# Patient Record
Sex: FEMALE | Race: WHITE | NOT HISPANIC OR LATINO | Employment: OTHER | URBAN - NONMETROPOLITAN AREA
[De-identification: names, ages, dates, MRNs, and addresses within clinical notes are randomized per-mention and may not be internally consistent; named-entity substitution may affect disease eponyms.]

---

## 2019-08-02 PROCEDURE — 93306 TTE W/DOPPLER COMPLETE: CPT | Performed by: INTERNAL MEDICINE

## 2019-08-06 ENCOUNTER — OUTSIDE FACILITY SERVICE (OUTPATIENT)
Dept: CARDIOLOGY | Facility: CLINIC | Age: 69
End: 2019-08-06

## 2021-02-02 DIAGNOSIS — M25.561 CHRONIC PAIN OF BOTH KNEES: Primary | ICD-10-CM

## 2021-02-02 DIAGNOSIS — G89.29 CHRONIC PAIN OF BOTH KNEES: Primary | ICD-10-CM

## 2021-02-02 DIAGNOSIS — M25.562 CHRONIC PAIN OF BOTH KNEES: Primary | ICD-10-CM

## 2021-02-18 ENCOUNTER — OFFICE VISIT (OUTPATIENT)
Dept: ORTHOPEDIC SURGERY | Facility: CLINIC | Age: 71
End: 2021-02-18

## 2021-02-18 ENCOUNTER — TELEPHONE (OUTPATIENT)
Dept: ORTHOPEDIC SURGERY | Facility: CLINIC | Age: 71
End: 2021-02-18

## 2021-02-18 ENCOUNTER — HOSPITAL ENCOUNTER (OUTPATIENT)
Dept: OTHER | Facility: HOSPITAL | Age: 71
Discharge: HOME | End: 2021-02-18
Attending: ORTHOPAEDIC SURGERY

## 2021-02-18 VITALS — WEIGHT: 185 LBS | BODY MASS INDEX: 32.78 KG/M2 | HEIGHT: 63 IN | TEMPERATURE: 96.3 F

## 2021-02-18 DIAGNOSIS — M17.12 PRIMARY OSTEOARTHRITIS OF LEFT KNEE: ICD-10-CM

## 2021-02-18 DIAGNOSIS — M17.11 PRIMARY OSTEOARTHRITIS OF RIGHT KNEE: Primary | ICD-10-CM

## 2021-02-18 PROCEDURE — 99203 OFFICE O/P NEW LOW 30 MIN: CPT | Performed by: ORTHOPAEDIC SURGERY

## 2021-02-18 RX ORDER — ATORVASTATIN CALCIUM 20 MG/1
20 TABLET, FILM COATED ORAL DAILY
COMMUNITY
End: 2023-02-02

## 2021-02-18 RX ORDER — FUROSEMIDE 40 MG/1
40 TABLET ORAL 2 TIMES DAILY
COMMUNITY
End: 2023-02-02

## 2021-02-18 NOTE — TELEPHONE ENCOUNTER
HUB STAFF ATTEMPTED WARM TRANSFER & WAS UNSUCCESSFUL     Caller: Woodrow Linda    Relationship to patient: Emergency Contact /       Best call back number: [phone]     Chief complaint: NEEDS TO RESCHEDULE FROM TODAY 02/18/21 @1345 DUE TO BEING SNOWED IN -  WOODROW STATED THEY ARE GOING TO TRY & MAKE IT BUT NOT SURE IF THEY'LL BE ABLE TO GET TO THE ROAD TO COME IN    Type of visit: IOV KNEES/NXR/TO OBTAIN @ BDX PRIOR TO APPT/XR ORDER FAXED    Requested date: AVAILABLE ANY DAY ANY TIME, NEEDS 3 HRS NOTICE    If rescheduling, when is the original appointment: TODAY 02/18/21 @1345    Additional notes:  WOODROW LINDA ALSO HAS POST OP APPT BEFORE PATIENT AT 1330      WOODROW CALL BACK [phone]     THANKS

## 2021-02-18 NOTE — PROGRESS NOTES
NEW VISIT    Patient: Chelsea Villalta  ?  YOB: 1950    MRN: [id number]  ?  Chief Complaint   Patient presents with   • Right Knee - Pain, Establish Care   • Left Knee - Pain, Establish Care      ?  HPI:   Chelsea Sidhu presents to the office with increasing pain and discomfort in both her knees.  She has more symptoms on the right side compared to the left side.  Symptoms have been progressively getting worse for several years but over the past 1 year they are definitely worse.  Most of the pain is on the lateral aspect of the knee.  She has difficulty going up and down the steps and squatting on the ground.  She denies any history of trauma to either knee.  Occasionally the knee will swell and feel very tight.  She is also had the knee buckle and give out from underneath her.  She has noted that she has progressively become knock kneed.  The patient states that she would like to defer surgical intervention as long as possible.      Pain Location: BILATERAL knee  Radiation: none  Quality: dull, aching  Intensity/Severity: moderate  Duration: About 2 years  Onset quality: gradual   Timing: intermittent  Aggravating Factors: kneeling, rising after sitting, squatting  Alleviating Factors: NSAIDs  Previous Episodes: yes  Associated Symptoms: pain, swelling, decreased ROM  ADLs Affected: ambulating, recreational activities/sports  Previous Treatment: Anti-inflammatory medication.    This patient is a new patient.  This problem is new to this examiner.      Allergies:   Allergies   Allergen Reactions   • Hctz [Hydrochlorothiazide]        Medications:   Home Medications:  Current Outpatient Medications on File Prior to Visit   Medication Sig   • allopurinol (ZYLOPRIM) 300 MG tablet    • ALPRAZolam (XANAX) 1 MG tablet TAKE ONE TABLET BY MOUTH TWICE DAILY AS NEEDED FOR ANXIETY (LF 9/20---CAN FILL 10/18   • atorvastatin (LIPITOR) 20 MG tablet Take 20 mg by mouth Daily.   • esomeprazole (nexIUM) 40 MG capsule     • furosemide (LASIX) 40 MG tablet Take 40 mg by mouth 2 (Two) Times a Day.   • KLOR-CON 10 MEQ CR tablet    • levothyroxine (SYNTHROID, LEVOTHROID) 200 MCG tablet    • zolpidem (AMBIEN) 10 MG tablet TAKE ONE TABLET BY MOUTH AT BEDTIME AS NEEDED FOR SLEEP (LF 9/20--- CAN FILL 10/18)   • bumetanide (BUMEX) 1 MG tablet    • diclofenac (VOLTAREN) 75 MG EC tablet    • prednisoLONE acetate (PRED FORTE) 1 % ophthalmic suspension USE 1 DROP IN RIGHT EYE TWICE A DAY   • WELCHOL 625 MG tablet      No current facility-administered medications on file prior to visit.      Current Medications:  Scheduled Meds:  PRN Meds:.    I have reviewed the patient's medical history in detail and updated the computerized patient record.  Review and summarization of old records include:    Past Medical History:   Diagnosis Date   • Difficulty sleeping    • Joint pain    • Stiffness in joint      Past Surgical History:   Procedure Laterality Date   • BACK SURGERY  1992    L4-L5   • FOOT SURGERY     • GALLBLADDER SURGERY     • HYSTERECTOMY     • THYROID SURGERY       Social History     Occupational History   • Not on file   Tobacco Use   • Smoking status: Never Smoker   • Smokeless tobacco: Never Used   Substance and Sexual Activity   • Alcohol use: Not Currently     Comment: occasional   • Drug use: Not on file   • Sexual activity: Not on file      Family History   Problem Relation Age of Onset   • Hypertension Mother    • Cancer Mother    • Cancer Father          Review of Systems   Constitutional: Negative.  Negative for fever.   HENT: Negative.    Eyes: Negative.    Respiratory: Negative.    Cardiovascular: Negative.    Endocrine: Negative.    Genitourinary: Negative.    Musculoskeletal: Positive for arthralgias, gait problem and joint swelling.   Skin: Negative.  Negative for rash and wound.   Allergic/Immunologic: Negative.    Neurological: Negative for numbness.   Hematological: Negative.    Psychiatric/Behavioral: Negative.        "    Wt Readings from Last 3 Encounters:   02/18/21 83.9 kg (185 lb)   11/08/16 90.7 kg (200 lb)     Ht Readings from Last 3 Encounters:   02/18/21 158.8 cm (62.5\")   11/08/16 156.2 cm (61.5\")     Body mass index is 33.3 kg/m².  Facility age limit for growth percentiles is 20 years.  Vitals:    02/18/21 1207   Temp: 96.3 °F (35.7 °C)         Physical Exam  Constitutional: Patient is oriented to person, place, and time. Appears well-developed and well-nourished.   HENT:   Head: Normocephalic and atraumatic.   Eyes: Conjunctivae and EOM are normal. Pupils are equal, round, and reactive to light.   Cardiovascular: Normal rate, regular rhythm, normal heart sounds and intact distal pulses.   Pulmonary/Chest: Effort normal and breath sounds normal.   Musculoskeletal:   See detailed exam below   Neurological: Alert and oriented to person, place, and time. No sensory deficit. Coordination normal.   Skin: Skin is warm and dry. Capillary refill takes less than 2 seconds. No rash noted. No erythema.   Psychiatric: Patient has a normal mood and affect. Her behavior is normal. Judgment and thought content normal.   Nursing note and vitals reviewed.      Ortho Exam:   Bilateral knee (valgus). Positive grind test. Pain and tenderness is present over the lateral aspect of the knee. Patient has some tenderness and irritability of the common peroneal nerve. Lateral joint line is exquisitely painful and tender. Patella is tending to track a little bit laterally. There is thickening of the synovial membrane. Range of motion in flexion is 0-110 degrees. Dorsalis pedis and posterior tibial artery pulses are palpable. Common peroneal nerve function is well preserved. Gait is cautious and antalgic. The patient has valgus orientation of the knee with a higher degree of external rotation than the contralateral side.There is fullness and tenderness in the Popliteal fossa. Getting out of a seated position is painful for the patient. "       Diagnostics:  no diagnostic testing performed this visit    Previously obtained x-rays are reviewed.  Patient has significant narrowing of the lateral joint space.  Osteophyte formation is noted.  The radiographic findings are worse on the right knee compared to the left knee.  She has a definite valgus deformity of the knee.  Bone-on-bone appearance is noted over the patellofemoral joint as well.  Assessment:  Diagnoses and all orders for this visit:    1. Primary osteoarthritis of right knee (Primary)    2. Primary osteoarthritis of left knee          Procedures  ?    Plan    · Compression/brace to the knee to prevent it from buckling and giving out.  · Tablet Voltaren 75 mg tab 1 p.o. twice daily for pain swelling and discomfort.  · Intra-articular steroid injection and viscosupplementation injections discussed and offered to the patient.  · Falls precautions discussed with the patient.  · Calcium and vitamin D for bone health.  · Eventually she will need right-sided knee replacement surgery based on symptom progression.  The patient states that she will let me know when she is ready for that form of intervention.  · Rest, ice, compression, and elevation (RICE) therapy  · Stretching and strengthening exercises of the quads and the hamstrings.  · Tylenol 500-1000mg by mouth every 6 hours as needed for pain   · Follow up in 6 month(s)    Date of encounter: 02/18/2021   Alirio Chapa MD

## 2021-02-22 PROBLEM — M17.12 PRIMARY OSTEOARTHRITIS OF LEFT KNEE: Status: ACTIVE | Noted: 2021-02-22

## 2021-02-22 PROBLEM — M17.11 PRIMARY OSTEOARTHRITIS OF RIGHT KNEE: Status: ACTIVE | Noted: 2021-02-22

## 2021-08-19 ENCOUNTER — OFFICE VISIT (OUTPATIENT)
Dept: ORTHOPEDIC SURGERY | Facility: CLINIC | Age: 71
End: 2021-08-19

## 2021-08-19 VITALS — BODY MASS INDEX: 32.78 KG/M2 | TEMPERATURE: 96.9 F | WEIGHT: 185 LBS | HEIGHT: 63 IN

## 2021-08-19 DIAGNOSIS — M17.11 PRIMARY OSTEOARTHRITIS OF RIGHT KNEE: Primary | ICD-10-CM

## 2021-08-19 DIAGNOSIS — M17.12 PRIMARY OSTEOARTHRITIS OF LEFT KNEE: ICD-10-CM

## 2021-08-19 PROCEDURE — 20610 DRAIN/INJ JOINT/BURSA W/O US: CPT | Performed by: ORTHOPAEDIC SURGERY

## 2021-08-19 RX ORDER — CEFDINIR 300 MG/1
300 CAPSULE ORAL 2 TIMES DAILY
COMMUNITY
Start: 2021-07-28 | End: 2021-11-18

## 2021-08-19 RX ORDER — PHENAZOPYRIDINE HYDROCHLORIDE 200 MG/1
TABLET, FILM COATED ORAL
COMMUNITY
Start: 2021-07-28 | End: 2023-02-02

## 2021-08-19 RX ADMIN — METHYLPREDNISOLONE ACETATE 160 MG: 80 INJECTION, SUSPENSION INTRA-ARTICULAR; INTRALESIONAL; INTRAMUSCULAR; SOFT TISSUE at 13:37

## 2021-08-19 NOTE — PROGRESS NOTES
"Chief Complaint  Follow-up and Pain of the Left Knee and Follow-up and Pain of the Right Knee    Subjective    History of Present Illness {CC  Problem List  Visit Diagnosis   Encounters  Notes  Medications  Labs  Result Review Imaging  Media :23}     Chelsea Villalta is a 70 y.o. female who presents to [facility] for   History of Present Illness   Pain Location: {AS Body Parts:64608}  Radiation: {asradiationpain:23418}  Quality: {asquality:20904}  Intensity/Severity: {asseveritypain:53086}  Duration: {Time:77395}  Progression of symptoms: {Stucker yes no:69486}  Onset quality: {asonsetquality:09808}  Timing: {astimin}  Aggravating Factors: {AS Aggravating Factors Ortho:39504}  Alleviating Factors: {AS Alleviating Factors:46716}  Previous Episodes: {aspreviousepisodes:16609}  Associated Symptoms: {asassociatedsymptoms:96664}  ADLs Affected: {ADL ASMEH:00935}  Previous Treatment: {AS previous treatment:59312}       Objective   Vital Signs:   Temp 96.9 °F (36.1 °C)   Ht 158.8 cm (62.52\")   Wt 83.9 kg (185 lb)   BMI 33.28 kg/m²     Physical Exam  Physical Exam  Vitals signs and nursing note reviewed.   Constitutional:       Appearance: Normal appearance.   Pulmonary:      Effort: Pulmonary effort is normal.   Skin:     General: Skin is warm and dry.      Capillary Refill: Capillary refill takes less than 2 seconds.   Neurological:      General: No focal deficit present.      Mental Status: He is alert and oriented to person, place, and time. Mental status is at baseline.   Psychiatric:         Mood and Affect: Mood normal.         Behavior: Behavior normal.         Thought Content: Thought content normal.         Judgment: Judgment normal.     Ortho Exam   {Musculoskeletal Exams:27486}    {Post Op Total Joint Arthroplasty Exam:89205}    {Post Op Detailed Exam:61932}        Result Review :{ Labs  Result Review  Imaging  Med Tab  Media :23}   The following data was " reviewed by: Leeanne Ortega MA on 08/19/2021:  {Data reviewed (optional):47372}  {Diagnostics options AS:35278}            Procedures           Assessment   Assessment and Plan {CC Problem List  Visit Diagnosis  ROS  Review (Popup)  Health Maintenance  Quality  BestPractice  Medications  SmartSets  SnapShot Encounters  Media :23}   There are no diagnoses linked to this encounter.    {Time Spent (Optional):28019}  Follow Up {Instructions Charge Capture  Follow-up Communications :23}  · Compression/brace  · Rest, ice, compression, and elevation (RICE) therapy  · Stretching and strengthening exercises  · OTC {OTC pain:38308}  · Follow up in *** {WEEKS/MONTHS:38468}  • Patient was given instructions and counseling regarding her condition or for health maintenance advice. Please see specific information pulled into the AVS if appropriate.     Leeanne Ortega MA   Date of Encounter: 8/19/2021   Electronically signed by Leeanne Ortega MA, 08/19/21, 1:22 PM EDT.     EMR Dragon/Transcription disclaimer:  Much of this encounter note is an electronic transcription/translation of spoken language to printed text. The electronic translation of spoken language may permit erroneous, or at times, nonsensical words or phrases to be inadvertently transcribed; Although I have reviewed the note for such errors, some may still exist.

## 2021-08-19 NOTE — PROGRESS NOTES
INJECTION    Patient: Chelsea Villalta    YOB: 1950    MRN: [id number]    Chief Complaint   Patient presents with   • Left Knee - Follow-up, Pain   • Right Knee - Follow-up, Pain       History of Present Illness: Patient returns today for bilateral knee pain.  Her pain is located over the medial aspect of the joint.  The pain has been progressive in nature and remains intermittent .  Her pain is worsened by going up and down stairs, rising after sitting. There has been improvement in the past with injections.     This problem is not new to this examiner.     Allergies:   Allergies   Allergen Reactions   • Hctz [Hydrochlorothiazide] Hives       Medications:   Home Medications:  Current Outpatient Medications on File Prior to Visit   Medication Sig   • allopurinol (ZYLOPRIM) 300 MG tablet    • ALPRAZolam (XANAX) 1 MG tablet TAKE ONE TABLET BY MOUTH TWICE DAILY AS NEEDED FOR ANXIETY (LF 9/20---CAN FILL 10/18   • atorvastatin (LIPITOR) 20 MG tablet Take 20 mg by mouth Daily.   • bumetanide (BUMEX) 1 MG tablet    • cefdinir (OMNICEF) 300 MG capsule Take 300 mg by mouth 2 (Two) Times a Day.   • diclofenac (VOLTAREN) 75 MG EC tablet    • esomeprazole (nexIUM) 40 MG capsule    • furosemide (LASIX) 40 MG tablet Take 40 mg by mouth 2 (Two) Times a Day.   • KLOR-CON 10 MEQ CR tablet    • levothyroxine (SYNTHROID, LEVOTHROID) 200 MCG tablet    • phenazopyridine (PYRIDIUM) 200 MG tablet TAKE 1 TABLET BY MOUTH 3 TIMES A DAY AS NEEDED FOR PAIN   • prednisoLONE acetate (PRED FORTE) 1 % ophthalmic suspension USE 1 DROP IN RIGHT EYE TWICE A DAY   • WELCHOL 625 MG tablet    • zolpidem (AMBIEN) 10 MG tablet TAKE ONE TABLET BY MOUTH AT BEDTIME AS NEEDED FOR SLEEP (LF 9/20--- CAN FILL 10/18)     No current facility-administered medications on file prior to visit.     Current Medications:  Scheduled Meds:  PRN Meds:.    I have reviewed the patient's medical history in detail and updated the computerized patient record.  Review  "and summarization of old records include:    Past Medical History:   Diagnosis Date   • Difficulty sleeping    • Joint pain    • Stiffness in joint      Past Surgical History:   Procedure Laterality Date   • BACK SURGERY  1992    L4-L5   • FOOT SURGERY     • GALLBLADDER SURGERY     • HYSTERECTOMY     • THYROID SURGERY       Social History     Occupational History   • Not on file   Tobacco Use   • Smoking status: Never Smoker   • Smokeless tobacco: Never Used   Substance and Sexual Activity   • Alcohol use: Not Currently     Comment: occasional   • Drug use: Not on file   • Sexual activity: Not on file      Social History     Social History Narrative   • Not on file     Family History   Problem Relation Age of Onset   • Hypertension Mother    • Cancer Mother    • Cancer Father        Review of Systems        Wt Readings from Last 3 Encounters:   08/19/21 83.9 kg (185 lb)   02/18/21 83.9 kg (185 lb)   11/08/16 90.7 kg (200 lb)     Ht Readings from Last 3 Encounters:   08/19/21 158.8 cm (62.52\")   02/18/21 158.8 cm (62.5\")   11/08/16 156.2 cm (61.5\")     Body mass index is 33.28 kg/m².  Facility age limit for growth percentiles is 20 years.  Vitals:    08/19/21 1320   Temp: 96.9 °F (36.1 °C)       Physical Exam    Musculoskeletal:    Bilateral knee (varus). Patient has crepitus throughout range of motion. Positive patellar grind test. Mild effusion. Lachman is negative. Pivot shift is negative. Anterior and posterior drawer signs are negative. Significant joint line tenderness is noted on the medial aspect of the knee. Patient has a varus orientation of the knee. There is fullness and tenderness in the Popliteal fossa. Mild distention of a Popliteal cyst is noted in this location. Range of motion in flexion is from 0-120 degrees. Neurovascular status is intact.  Dorsalis pedis and posterior tibial artery pulses are palpable. Common peroneal nerve function is well preserved. Patient's gait is cautious and antalgic. Skin " and soft tissues are mildly swollen, consistent with synovitis and effusion. The patient has a significant limp with the first few steps after starting the gait cycle. Getting out of a chair takes a lot of effort due to pain on knee flexion.       Diagnostics:      Procedure:  Large Joint Arthrocentesis: R knee  Date/Time: 8/19/2021 1:37 PM  Consent given by: patient  Site marked: site marked  Timeout: Immediately prior to procedure a time out was called to verify the correct patient, procedure, equipment, support staff and site/side marked as required   Supporting Documentation  Indications: pain   Procedure Details  Location: knee - R knee  Preparation: Patient was prepped and draped in the usual sterile fashion  Needle size: 25 G  Approach: anteromedial  Medications administered: 160 mg methylPREDNISolone acetate 80 MG/ML; 2 mL lidocaine (cardiac)  Patient tolerance: patient tolerated the procedure well with no immediate complications    Large Joint Arthrocentesis: L knee  Date/Time: 8/19/2021 1:37 PM  Consent given by: patient  Site marked: site marked  Timeout: Immediately prior to procedure a time out was called to verify the correct patient, procedure, equipment, support staff and site/side marked as required   Supporting Documentation  Indications: pain   Procedure Details  Location: knee - L knee  Preparation: Patient was prepped and draped in the usual sterile fashion  Needle size: 25 G  Approach: anteromedial  Medications administered: 160 mg methylPREDNISolone acetate 80 MG/ML; 2 mL lidocaine (cardiac)  Patient tolerance: patient tolerated the procedure well with no immediate complications            Assessment:   Diagnoses and all orders for this visit:    1. Primary osteoarthritis of right knee (Primary)    2. Primary osteoarthritis of left knee    Other orders  -     Large Joint Arthrocentesis: R knee  -     Large Joint Arthrocentesis: L knee          Plan:   · Injected patient's bilateral knee  joint(s)with Depo-Medrol from an anteromedial approach   · Compression/brace   · Rest, ice, compression, and elevation (RICE) therapy  · OTC Alternate Ibuprofen and Tylenol as needed  · Follow up in 3 month(s)    Date of encounter: 08/19/2021   Alirio Chapa MD

## 2021-08-25 RX ORDER — METHYLPREDNISOLONE ACETATE 80 MG/ML
160 INJECTION, SUSPENSION INTRA-ARTICULAR; INTRALESIONAL; INTRAMUSCULAR; SOFT TISSUE
Status: COMPLETED | OUTPATIENT
Start: 2021-08-19 | End: 2021-08-19

## 2021-11-18 ENCOUNTER — CLINICAL SUPPORT (OUTPATIENT)
Dept: ORTHOPEDIC SURGERY | Facility: CLINIC | Age: 71
End: 2021-11-18

## 2021-11-18 VITALS — WEIGHT: 181 LBS | TEMPERATURE: 97.5 F | HEIGHT: 62 IN | BODY MASS INDEX: 33.31 KG/M2

## 2021-11-18 DIAGNOSIS — M17.11 PRIMARY OSTEOARTHRITIS OF RIGHT KNEE: Primary | ICD-10-CM

## 2021-11-18 DIAGNOSIS — M17.12 PRIMARY OSTEOARTHRITIS OF LEFT KNEE: ICD-10-CM

## 2021-11-18 PROCEDURE — 20610 DRAIN/INJ JOINT/BURSA W/O US: CPT | Performed by: ORTHOPAEDIC SURGERY

## 2021-11-18 RX ORDER — METHYLPREDNISOLONE ACETATE 80 MG/ML
160 INJECTION, SUSPENSION INTRA-ARTICULAR; INTRALESIONAL; INTRAMUSCULAR; SOFT TISSUE
Status: COMPLETED | OUTPATIENT
Start: 2021-11-18 | End: 2021-11-18

## 2021-11-18 RX ADMIN — METHYLPREDNISOLONE ACETATE 160 MG: 80 INJECTION, SUSPENSION INTRA-ARTICULAR; INTRALESIONAL; INTRAMUSCULAR; SOFT TISSUE at 10:42

## 2021-11-18 NOTE — PROGRESS NOTES
"Chief Complaint  Follow-up of the Left Knee and Follow-up of the Right Knee    Subjective    History of Present Illness      Chelsea Villalta is a 71 y.o. female who presents to [facility] for Patient returns today for bilateral knee pain.  Her pain is located over the medial aspect of the joint.  The pain has been progressive in nature and remains intermittent .  Her pain is worsened by going up and down stairs. There has been improvement in the past with injections.       Objective   Vital Signs:   Temp 97.5 °F (36.4 °C)   Ht 157.5 cm (62\")   Wt 82.1 kg (181 lb)   BMI 33.11 kg/m²     Physical Exam  71 y.o. female is awake, alert, oriented, in no acute distress and well developed, well nourished.  Ortho Exam   BILATERAL knee  Bilateral knee (varus). Patient has crepitus throughout range of motion. Positive patellar grind test. Mild effusion. Lachman is negative. Pivot shift is negative. Anterior and posterior drawer signs are negative. Significant joint line tenderness is noted on the medial aspect of the knee. Patient has a varus orientation of the knee. There is fullness and tenderness in the Popliteal fossa. Mild distention of a Popliteal cyst is noted in this location. Range of motion in flexion is from 0-110 degrees. Neurovascular status is intact.  Dorsalis pedis and posterior tibial artery pulses are palpable. Common peroneal nerve function is well preserved. Patient's gait is cautious and antalgic. Skin and soft tissues are mildly swollen, consistent with synovitis and effusion. The patient has a significant limp with the first few steps after starting the gait cycle. Getting out of a chair takes a lot of effort due to pain on knee flexion.       Result Review :                  Large Joint Arthrocentesis: R knee  Date/Time: 11/18/2021 10:42 AM  Consent given by: patient  Site marked: site marked  Timeout: Immediately prior to procedure a time out was called to verify the " correct patient, procedure, equipment, support staff and site/side marked as required   Supporting Documentation  Indications: pain and joint swelling   Procedure Details  Location: knee - R knee  Preparation: Patient was prepped and draped in the usual sterile fashion  Needle size: 25 G  Approach: anteromedial  Medications administered: 160 mg methylPREDNISolone acetate 80 MG/ML; 2 mL lidocaine (cardiac)  Patient tolerance: patient tolerated the procedure well with no immediate complications    Large Joint Arthrocentesis: L knee  Date/Time: 11/18/2021 10:42 AM  Consent given by: patient  Site marked: site marked  Timeout: Immediately prior to procedure a time out was called to verify the correct patient, procedure, equipment, support staff and site/side marked as required   Supporting Documentation  Indications: pain and joint swelling   Procedure Details  Location: knee - L knee  Preparation: Patient was prepped and draped in the usual sterile fashion  Needle size: 25 G  Approach: anteromedial  Medications administered: 160 mg methylPREDNISolone acetate 80 MG/ML; 2 mL lidocaine (cardiac)  Patient tolerance: patient tolerated the procedure well with no immediate complications               Assessment   Assessment and Plan    Problem List Items Addressed This Visit     None          Follow Up   · Injected patient's bilateral knee joint(s)with Depo-Medrol from an anteromedial approach   · Compression/brace   · Rest, ice, compression, and elevation (RICE) therapy  · OTC Alternate Ibuprofen and Tylenol as needed  · Follow up in 3 month(s)  • Patient was given instructions and counseling regarding her condition or for health maintenance advice. Please see specific information pulled into the AVS if appropriate.     Alirio Chapa MD   Date of Encounter: 11/18/2021     EMR Dragon/Transcription disclaimer:  Much of this encounter note is an electronic transcription/translation of spoken language to printed text. The  electronic translation of spoken language may permit erroneous, or at times, nonsensical words or phrases to be inadvertently transcribed; Although I have reviewed the note for such errors, some may still exist.

## 2022-02-17 ENCOUNTER — CLINICAL SUPPORT (OUTPATIENT)
Dept: ORTHOPEDIC SURGERY | Facility: CLINIC | Age: 72
End: 2022-02-17

## 2022-02-17 VITALS — TEMPERATURE: 97.3 F | HEIGHT: 62 IN | BODY MASS INDEX: 33.31 KG/M2 | WEIGHT: 181 LBS

## 2022-02-17 DIAGNOSIS — M17.12 PRIMARY OSTEOARTHRITIS OF LEFT KNEE: ICD-10-CM

## 2022-02-17 DIAGNOSIS — M17.11 PRIMARY OSTEOARTHRITIS OF RIGHT KNEE: Primary | ICD-10-CM

## 2022-02-17 PROCEDURE — 20610 DRAIN/INJ JOINT/BURSA W/O US: CPT | Performed by: ORTHOPAEDIC SURGERY

## 2022-02-17 RX ORDER — LIDOCAINE HYDROCHLORIDE 10 MG/ML
2 INJECTION, SOLUTION EPIDURAL; INFILTRATION; INTRACAUDAL; PERINEURAL
Status: COMPLETED | OUTPATIENT
Start: 2022-02-17 | End: 2022-02-17

## 2022-02-17 RX ORDER — ATORVASTATIN CALCIUM 40 MG/1
40 TABLET, FILM COATED ORAL DAILY
COMMUNITY
Start: 2021-11-24

## 2022-02-17 RX ORDER — LEVOFLOXACIN 500 MG/1
TABLET, FILM COATED ORAL
COMMUNITY
Start: 2021-11-29 | End: 2023-02-02

## 2022-02-17 RX ORDER — METHYLPREDNISOLONE ACETATE 80 MG/ML
160 INJECTION, SUSPENSION INTRA-ARTICULAR; INTRALESIONAL; INTRAMUSCULAR; SOFT TISSUE
Status: COMPLETED | OUTPATIENT
Start: 2022-02-17 | End: 2022-02-17

## 2022-02-17 RX ADMIN — METHYLPREDNISOLONE ACETATE 160 MG: 80 INJECTION, SUSPENSION INTRA-ARTICULAR; INTRALESIONAL; INTRAMUSCULAR; SOFT TISSUE at 13:11

## 2022-02-17 RX ADMIN — METHYLPREDNISOLONE ACETATE 160 MG: 80 INJECTION, SUSPENSION INTRA-ARTICULAR; INTRALESIONAL; INTRAMUSCULAR; SOFT TISSUE at 13:10

## 2022-02-17 RX ADMIN — LIDOCAINE HYDROCHLORIDE 2 ML: 10 INJECTION, SOLUTION EPIDURAL; INFILTRATION; INTRACAUDAL; PERINEURAL at 13:11

## 2022-02-17 RX ADMIN — LIDOCAINE HYDROCHLORIDE 2 ML: 10 INJECTION, SOLUTION EPIDURAL; INFILTRATION; INTRACAUDAL; PERINEURAL at 13:10

## 2022-02-17 NOTE — PROGRESS NOTES
"Chief Complaint  Follow-up and Pain of the Left Knee, Follow-up and Pain of the Right Knee, and Injections    Subjective    History of Present Illness      Chelsea Villalta is a 71 y.o. female who presents to [facility] for Patient returns today for bilateral knee pain.  Her pain is located over the medial aspect of the joint.  The pain has been progressive in nature and remains intermittent .  Her pain is worsened by going up and down stairs. There has been improvement in the past with injections.       Objective   Vital Signs:   Temp 97.3 °F (36.3 °C)   Ht 157.5 cm (62.01\")   Wt 82.1 kg (181 lb)   BMI 33.10 kg/m²     Physical Exam  71 y.o. female is awake, alert, oriented, in no acute distress and well developed, well nourished.  Ortho Exam   BILATERAL knee  Bilateral knee (varus). Patient has crepitus throughout range of motion. Positive patellar grind test. Mild effusion. Lachman is negative. Pivot shift is negative. Anterior and posterior drawer signs are negative. Significant joint line tenderness is noted on the medial aspect of the knee. Patient has a varus orientation of the knee. There is fullness and tenderness in the Popliteal fossa. Mild distention of a Popliteal cyst is noted in this location. Range of motion in flexion is from 0-110 degrees. Neurovascular status is intact.  Dorsalis pedis and posterior tibial artery pulses are palpable. Common peroneal nerve function is well preserved. Patient's gait is cautious and antalgic. Skin and soft tissues are mildly swollen, consistent with synovitis and effusion. The patient has a significant limp with the first few steps after starting the gait cycle. Getting out of a chair takes a lot of effort due to pain on knee flexion.       Result Review :                  Large Joint Arthrocentesis: R knee  Date/Time: 2/17/2022 1:10 PM  Consent given by: patient  Site marked: site marked  Timeout: Immediately prior to procedure a time " out was called to verify the correct patient, procedure, equipment, support staff and site/side marked as required   Supporting Documentation  Indications: pain and joint swelling   Procedure Details  Location: knee - R knee  Preparation: Patient was prepped and draped in the usual sterile fashion  Needle size: 22 G  Approach: anteromedial  Medications administered: 160 mg methylPREDNISolone acetate 80 MG/ML; 2 mL lidocaine PF 1% 1 %  Patient tolerance: patient tolerated the procedure well with no immediate complications    Large Joint Arthrocentesis: L knee  Date/Time: 2/17/2022 1:11 PM  Consent given by: patient  Site marked: site marked  Timeout: Immediately prior to procedure a time out was called to verify the correct patient, procedure, equipment, support staff and site/side marked as required   Supporting Documentation  Indications: pain and joint swelling   Procedure Details  Location: knee - L knee  Preparation: Patient was prepped and draped in the usual sterile fashion  Needle size: 22 G  Approach: anteromedial  Medications administered: 160 mg methylPREDNISolone acetate 80 MG/ML; 2 mL lidocaine PF 1% 1 %  Patient tolerance: patient tolerated the procedure well with no immediate complications               Assessment   Assessment and Plan    Problem List Items Addressed This Visit        Musculoskeletal and Injuries    Primary osteoarthritis of right knee - Primary    Primary osteoarthritis of left knee          Follow Up   · Injected patient's bilateral knee joint(s)with Depo-Medrol from an anteromedial approach   · Compression/brace   · Rest, ice, compression, and elevation (RICE) therapy  · OTC Alternate Ibuprofen and Tylenol as needed  · Follow up in 3 month(s)  • Patient was given instructions and counseling regarding her condition or for health maintenance advice. Please see specific information pulled into the AVS if appropriate.     Alirio Chapa MD   Date of Encounter: 2/17/2022        EMR  Dragon/Transcription disclaimer:  Much of this encounter note is an electronic transcription/translation of spoken language to printed text. The electronic translation of spoken language may permit erroneous, or at times, nonsensical words or phrases to be inadvertently transcribed; Although I have reviewed the note for such errors, some may still exist.

## 2022-07-28 ENCOUNTER — CLINICAL SUPPORT (OUTPATIENT)
Dept: ORTHOPEDIC SURGERY | Facility: CLINIC | Age: 72
End: 2022-07-28

## 2022-07-28 VITALS — TEMPERATURE: 98 F | HEIGHT: 62 IN | WEIGHT: 187 LBS | BODY MASS INDEX: 34.41 KG/M2

## 2022-07-28 DIAGNOSIS — M17.12 PRIMARY OSTEOARTHRITIS OF LEFT KNEE: ICD-10-CM

## 2022-07-28 DIAGNOSIS — M17.11 PRIMARY OSTEOARTHRITIS OF RIGHT KNEE: Primary | ICD-10-CM

## 2022-07-28 PROCEDURE — 20610 DRAIN/INJ JOINT/BURSA W/O US: CPT | Performed by: ORTHOPAEDIC SURGERY

## 2022-07-28 RX ORDER — METHYLPREDNISOLONE ACETATE 80 MG/ML
160 INJECTION, SUSPENSION INTRA-ARTICULAR; INTRALESIONAL; INTRAMUSCULAR; SOFT TISSUE
Status: COMPLETED | OUTPATIENT
Start: 2022-07-28 | End: 2022-07-28

## 2022-07-28 RX ADMIN — METHYLPREDNISOLONE ACETATE 160 MG: 80 INJECTION, SUSPENSION INTRA-ARTICULAR; INTRALESIONAL; INTRAMUSCULAR; SOFT TISSUE at 12:48

## 2022-07-28 NOTE — PROGRESS NOTES
"Chief Complaint  Follow-up and Pain of the Left Knee and Follow-up and Pain of the Right Knee    Subjective    History of Present Illness      Chelsea Villalta is a 71 y.o. female who presents to [facility] for Patient returns today for bilateral knee pain.  Her pain is located over the medial and lateral aspect of the joint.  The pain has been progressive in nature and remains intermittent .  Her pain is worsened by kneeling, squatting. There has been improvement in the past with heat, NSAIDS and injections.       Objective   Vital Signs:   Temp 98 °F (36.7 °C)   Ht 157.5 cm (62\")   Wt 84.8 kg (187 lb)   BMI 34.20 kg/m²     Physical Exam  71 y.o. female is awake, alert, oriented, in no acute distress and well developed, well nourished.  Ortho Exam   BILATERAL knee  Bilateral knee (varus). Patient has crepitus throughout range of motion. Positive patellar grind test. Mild effusion. Lachman is negative. Pivot shift is negative. Anterior and posterior drawer signs are negative. Significant joint line tenderness is noted on the medial aspect of the knee. Patient has a varus orientation of the knee. There is fullness and tenderness in the Popliteal fossa. Mild distention of a Popliteal cyst is noted in this location. Range of motion in flexion is from 0-110 degrees. Neurovascular status is intact.  Dorsalis pedis and posterior tibial artery pulses are palpable. Common peroneal nerve function is well preserved. Patient's gait is cautious and antalgic. Skin and soft tissues are mildly swollen, consistent with synovitis and effusion. The patient has a significant limp with the first few steps after starting the gait cycle. Getting out of a chair takes a lot of effort due to pain on knee flexion.       Result Review :                  - Large Joint Arthrocentesis: bilateral knee on 7/28/2022 12:48 PM  Indications: pain  Details: 25 G needle, anteromedial approach  Medications (Right): 160 mg " methylPREDNISolone acetate 80 MG/ML  Medications (Left): 160 mg methylPREDNISolone acetate 80 MG/ML  Outcome: tolerated well, no immediate complications  Consent was given by the patient. Immediately prior to procedure a time out was called to verify the correct patient, procedure, equipment, support staff and site/side marked as required. Patient was prepped and draped in the usual sterile fashion.                Assessment   Assessment and Plan    Problem List Items Addressed This Visit        Musculoskeletal and Injuries    Primary osteoarthritis of right knee - Primary    Primary osteoarthritis of left knee          Follow Up   · Injected patient's bilateral knee joint(s)with Depo-Medrol from an anteromedial approach   · Compression/brace   · Rest, ice, compression, and elevation (RICE) therapy  · OTC Ibuprofen 600mg by mouth every 6-8 hours as needed for pain and swelling  · Follow up in 3 month(s)  • Patient was given instructions and counseling regarding her condition or for health maintenance advice. Please see specific information pulled into the AVS if appropriate.     Alirio Chapa MD   Date of Encounter: 7/28/2022       EMR Dragon/Transcription disclaimer:  Much of this encounter note is an electronic transcription/translation of spoken language to printed text. The electronic translation of spoken language may permit erroneous, or at times, nonsensical words or phrases to be inadvertently transcribed; Although I have reviewed the note for such errors, some may still exist.

## 2022-10-27 ENCOUNTER — CLINICAL SUPPORT (OUTPATIENT)
Dept: ORTHOPEDIC SURGERY | Facility: CLINIC | Age: 72
End: 2022-10-27

## 2022-10-27 VITALS — BODY MASS INDEX: 33.31 KG/M2 | WEIGHT: 181 LBS | HEIGHT: 62 IN | TEMPERATURE: 98.1 F

## 2022-10-27 DIAGNOSIS — M17.12 PRIMARY OSTEOARTHRITIS OF LEFT KNEE: ICD-10-CM

## 2022-10-27 DIAGNOSIS — M17.11 PRIMARY OSTEOARTHRITIS OF RIGHT KNEE: Primary | ICD-10-CM

## 2022-10-27 PROBLEM — E66.9 OBESITY (BMI 30.0-34.9): Status: ACTIVE | Noted: 2022-10-27

## 2022-10-27 PROCEDURE — 20610 DRAIN/INJ JOINT/BURSA W/O US: CPT | Performed by: ORTHOPAEDIC SURGERY

## 2022-10-27 RX ORDER — METHYLPREDNISOLONE ACETATE 40 MG/ML
80 INJECTION, SUSPENSION INTRA-ARTICULAR; INTRALESIONAL; INTRAMUSCULAR; SOFT TISSUE
Status: COMPLETED | OUTPATIENT
Start: 2022-10-27 | End: 2022-10-27

## 2022-10-27 RX ORDER — LIDOCAINE HYDROCHLORIDE 10 MG/ML
2 INJECTION, SOLUTION EPIDURAL; INFILTRATION; INTRACAUDAL; PERINEURAL
Status: COMPLETED | OUTPATIENT
Start: 2022-10-27 | End: 2022-10-27

## 2022-10-27 RX ADMIN — LIDOCAINE HYDROCHLORIDE 2 ML: 10 INJECTION, SOLUTION EPIDURAL; INFILTRATION; INTRACAUDAL; PERINEURAL at 12:54

## 2022-10-27 RX ADMIN — METHYLPREDNISOLONE ACETATE 80 MG: 40 INJECTION, SUSPENSION INTRA-ARTICULAR; INTRALESIONAL; INTRAMUSCULAR; SOFT TISSUE at 12:54

## 2022-10-27 NOTE — PROGRESS NOTES
"Chief Complaint  Follow-up of the Left Knee and Follow-up of the Right Knee    Subjective    History of Present Illness      Chelsea Villalta is a 72 y.o. female who presents to [facility] for Patient returns today for bilateral knee pain.  Her pain is located over the medial aspect of the joint.  The pain has been progressive in nature and remains intermittent .  Her pain is worsened by kneeling, squatting. There has been improvement in the past with injections.       Objective   Vital Signs:   Temp 98.1 °F (36.7 °C)   Ht 157.5 cm (62\")   Wt 82.1 kg (181 lb)   BMI 33.11 kg/m²     Physical Exam  72 y.o. female is awake, alert, oriented, in no acute distress and well developed, well nourished.  Ortho Exam   BILATERAL knee  Bilateral knee (varus). Patient has crepitus throughout range of motion. Positive patellar grind test. Mild effusion. Lachman is negative. Pivot shift is negative. Anterior and posterior drawer signs are negative. Significant joint line tenderness is noted on the medial aspect of the knee. Patient has a varus orientation of the knee. There is fullness and tenderness in the Popliteal fossa. Mild distention of a Popliteal cyst is noted in this location. Range of motion in flexion is from 0-110 degrees. Neurovascular status is intact.  Dorsalis pedis and posterior tibial artery pulses are palpable. Common peroneal nerve function is well preserved. Patient's gait is cautious and antalgic. Skin and soft tissues are mildly swollen, consistent with synovitis and effusion. The patient has a significant limp with the first few steps after starting the gait cycle. Getting out of a chair takes a lot of effort due to pain on knee flexion.       Result Review :                  - Large Joint Arthrocentesis: bilateral knee on 10/27/2022 12:54 PM  Indications: pain  Details: 25 G needle, anteromedial approach  Medications (Right): 80 mg methylPREDNISolone acetate 40 MG/ML; 2 mL " lidocaine PF 1% 1 %  Medications (Left): 80 mg methylPREDNISolone acetate 40 MG/ML; 2 mL lidocaine PF 1% 1 %  Outcome: tolerated well, no immediate complications  Procedure, treatment alternatives, risks and benefits explained, specific risks discussed. Consent was given by the patient. Patient was prepped and draped in the usual sterile fashion.                Assessment   Assessment and Plan    Problem List Items Addressed This Visit        Musculoskeletal and Injuries    Primary osteoarthritis of right knee - Primary    Relevant Orders    - Large Joint Arthrocentesis: bilateral knee    Primary osteoarthritis of left knee    Relevant Orders    - Large Joint Arthrocentesis: bilateral knee       Follow Up   · Injected patient's bilateral knee joint(s)with Depo-Medrol from an anteromedial approach   · Compression/brace   · Rest, ice, compression, and elevation (RICE) therapy  · OTC Alternate Ibuprofen and Tylenol as needed  · Follow up in 3 month(s)  • Patient was given instructions and counseling regarding her condition or for health maintenance advice. Please see specific information pulled into the AVS if appropriate.     Alirio Chapa MD   Date of Encounter: 10/27/2022   Electronically signed by Alirio Chapa MD, 10/27/22, 12:53 PM EDT.     EMR Dragon/Transcription disclaimer:  Much of this encounter note is an electronic transcription/translation of spoken language to printed text. The electronic translation of spoken language may permit erroneous, or at times, nonsensical words or phrases to be inadvertently transcribed; Although I have reviewed the note for such errors, some may still exist.

## 2023-02-02 ENCOUNTER — CLINICAL SUPPORT (OUTPATIENT)
Dept: ORTHOPEDIC SURGERY | Facility: CLINIC | Age: 73
End: 2023-02-02
Payer: MEDICARE

## 2023-02-02 VITALS — HEIGHT: 62 IN | WEIGHT: 183 LBS | BODY MASS INDEX: 33.68 KG/M2 | TEMPERATURE: 98.3 F

## 2023-02-02 DIAGNOSIS — M17.12 PRIMARY OSTEOARTHRITIS OF LEFT KNEE: ICD-10-CM

## 2023-02-02 DIAGNOSIS — M17.11 PRIMARY OSTEOARTHRITIS OF RIGHT KNEE: Primary | ICD-10-CM

## 2023-02-02 PROCEDURE — 20610 DRAIN/INJ JOINT/BURSA W/O US: CPT | Performed by: ORTHOPAEDIC SURGERY

## 2023-02-02 RX ORDER — METHYLPREDNISOLONE ACETATE 80 MG/ML
160 INJECTION, SUSPENSION INTRA-ARTICULAR; INTRALESIONAL; INTRAMUSCULAR; SOFT TISSUE
Status: COMPLETED | OUTPATIENT
Start: 2023-02-02 | End: 2023-02-02

## 2023-02-02 RX ORDER — LIDOCAINE HYDROCHLORIDE 10 MG/ML
2 INJECTION, SOLUTION EPIDURAL; INFILTRATION; INTRACAUDAL; PERINEURAL
Status: COMPLETED | OUTPATIENT
Start: 2023-02-02 | End: 2023-02-02

## 2023-02-02 RX ADMIN — LIDOCAINE HYDROCHLORIDE 2 ML: 10 INJECTION, SOLUTION EPIDURAL; INFILTRATION; INTRACAUDAL; PERINEURAL at 13:12

## 2023-02-02 RX ADMIN — METHYLPREDNISOLONE ACETATE 160 MG: 80 INJECTION, SUSPENSION INTRA-ARTICULAR; INTRALESIONAL; INTRAMUSCULAR; SOFT TISSUE at 13:12

## 2023-02-02 NOTE — PROGRESS NOTES
"Chief Complaint  Follow-up of the Left Knee and Follow-up of the Right Knee    Subjective    History of Present Illness      Chelsea Villalta is a 72 y.o. female who presents to [facility] for Patient returns today for bilateral knee pain.  Her pain is located over the medial aspect of the joint.  The pain has been progressive in nature and remains intermittent .  Her pain is worsened by going up and down stairs. There has been improvement in the past with injections.       Objective   Vital Signs:   Temp 98.3 °F (36.8 °C)   Ht 157.5 cm (62\")   Wt 83 kg (183 lb)   BMI 33.47 kg/m²     Physical Exam  72 y.o. female is awake, alert, oriented, in no acute distress and well developed, well nourished.  Ortho Exam   BILATERAL knee  Bilateral knee (valgus). Positive grind test. Pain and tenderness is present over the lateral aspect of the knee. Patient has some tenderness and irritability of the common peroneal nerve. Lateral joint line is exquisitely painful and tender. Patella is tending to track a little bit laterally. There is thickening of the synovial membrane. Range of motion in flexion is 0-110 degrees. Dorsalis pedis and posterior tibial artery pulses are palpable. Common peroneal nerve function is well preserved. Gait is cautious and antalgic. The patient has valgus orientation of the knee with a higher degree of external rotation than the contralateral side.There is fullness and tenderness in the Popliteal fossa. Getting out of a seated position is painful for the patient.       Result Review :                  - Large Joint Arthrocentesis: bilateral knee on 2/2/2023 1:12 PM  Indications: pain  Details: 25 G needle, anteromedial approach  Medications (Right): 2 mL lidocaine PF 1% 1 %; 160 mg methylPREDNISolone acetate 80 MG/ML  Medications (Left): 2 mL lidocaine PF 1% 1 %; 160 mg methylPREDNISolone acetate 80 MG/ML  Outcome: tolerated well, no immediate complications  Procedure, " treatment alternatives, risks and benefits explained, specific risks discussed. Consent was given by the patient. Patient was prepped and draped in the usual sterile fashion.                Assessment   Assessment and Plan    Problem List Items Addressed This Visit        Musculoskeletal and Injuries    Primary osteoarthritis of right knee - Primary    Relevant Medications    lidocaine PF 1% (XYLOCAINE) injection 2 mL (Completed)    methylPREDNISolone acetate (DEPO-medrol) injection 160 mg (Completed)    lidocaine PF 1% (XYLOCAINE) injection 2 mL (Completed)    methylPREDNISolone acetate (DEPO-medrol) injection 160 mg (Completed)    Other Relevant Orders    - Large Joint Arthrocentesis: bilateral knee (Completed)    Primary osteoarthritis of left knee    Relevant Medications    lidocaine PF 1% (XYLOCAINE) injection 2 mL (Completed)    methylPREDNISolone acetate (DEPO-medrol) injection 160 mg (Completed)    lidocaine PF 1% (XYLOCAINE) injection 2 mL (Completed)    methylPREDNISolone acetate (DEPO-medrol) injection 160 mg (Completed)    Other Relevant Orders    - Large Joint Arthrocentesis: bilateral knee (Completed)       Follow Up   · Injected patient's bilateral knee joint(s)with Depo-Medrol from an anteromedial approach   · Compression/brace   · Rest, ice, compression, and elevation (RICE) therapy  · OTC Alternate Ibuprofen and Tylenol as needed  · Follow up in 3 month(s)  • Patient was given instructions and counseling regarding her condition or for health maintenance advice. Please see specific information pulled into the AVS if appropriate.     Alirio Chapa MD   Date of Encounter: 2/2/2023   Electronically signed by Alirio Chapa MD, 02/02/23, 1:12 PM EST.     EMR Dragon/Transcription disclaimer:  Much of this encounter note is an electronic transcription/translation of spoken language to printed text. The electronic translation of spoken language may permit erroneous, or at times, nonsensical words or phrases  to be inadvertently transcribed; Although I have reviewed the note for such errors, some may still exist.

## 2023-05-04 ENCOUNTER — CLINICAL SUPPORT (OUTPATIENT)
Dept: ORTHOPEDIC SURGERY | Facility: CLINIC | Age: 73
End: 2023-05-04
Payer: MEDICARE

## 2023-05-04 VITALS — TEMPERATURE: 96.9 F | BODY MASS INDEX: 33.68 KG/M2 | WEIGHT: 183 LBS | HEIGHT: 62 IN

## 2023-05-04 DIAGNOSIS — M17.12 PRIMARY OSTEOARTHRITIS OF LEFT KNEE: Primary | ICD-10-CM

## 2023-05-04 DIAGNOSIS — M17.11 PRIMARY OSTEOARTHRITIS OF RIGHT KNEE: ICD-10-CM

## 2023-05-04 RX ORDER — LIDOCAINE HYDROCHLORIDE 10 MG/ML
2 INJECTION, SOLUTION EPIDURAL; INFILTRATION; INTRACAUDAL; PERINEURAL
Status: COMPLETED | OUTPATIENT
Start: 2023-05-04 | End: 2023-05-04

## 2023-05-04 RX ORDER — METHYLPREDNISOLONE ACETATE 80 MG/ML
160 INJECTION, SUSPENSION INTRA-ARTICULAR; INTRALESIONAL; INTRAMUSCULAR; SOFT TISSUE
Status: COMPLETED | OUTPATIENT
Start: 2023-05-04 | End: 2023-05-04

## 2023-05-04 RX ADMIN — LIDOCAINE HYDROCHLORIDE 2 ML: 10 INJECTION, SOLUTION EPIDURAL; INFILTRATION; INTRACAUDAL; PERINEURAL at 13:04

## 2023-05-04 RX ADMIN — METHYLPREDNISOLONE ACETATE 160 MG: 80 INJECTION, SUSPENSION INTRA-ARTICULAR; INTRALESIONAL; INTRAMUSCULAR; SOFT TISSUE at 13:04

## 2023-08-24 ENCOUNTER — CLINICAL SUPPORT (OUTPATIENT)
Dept: ORTHOPEDIC SURGERY | Facility: CLINIC | Age: 73
End: 2023-08-24
Payer: MEDICARE

## 2023-08-24 VITALS — TEMPERATURE: 97.8 F | BODY MASS INDEX: 33.68 KG/M2 | HEIGHT: 62 IN | WEIGHT: 183 LBS

## 2023-08-24 DIAGNOSIS — M17.12 PRIMARY OSTEOARTHRITIS OF LEFT KNEE: Primary | ICD-10-CM

## 2023-08-24 DIAGNOSIS — M17.11 PRIMARY OSTEOARTHRITIS OF RIGHT KNEE: ICD-10-CM

## 2023-08-24 RX ORDER — FUROSEMIDE 40 MG/1
1 TABLET ORAL DAILY
COMMUNITY
Start: 2023-06-04

## 2023-08-24 RX ORDER — DICLOFENAC SODIUM 75 MG/1
1 TABLET, DELAYED RELEASE ORAL EVERY 12 HOURS SCHEDULED
COMMUNITY
Start: 2023-06-15

## 2023-08-24 RX ORDER — LANOLIN ALCOHOL/MO/W.PET/CERES
1 CREAM (GRAM) TOPICAL EVERY 12 HOURS SCHEDULED
COMMUNITY
Start: 2023-06-06

## 2023-08-24 RX ADMIN — LIDOCAINE HYDROCHLORIDE 2 ML: 20 INJECTION, SOLUTION EPIDURAL; INFILTRATION; INTRACAUDAL; PERINEURAL at 13:45

## 2023-08-24 RX ADMIN — METHYLPREDNISOLONE ACETATE 160 MG: 80 INJECTION, SUSPENSION INTRA-ARTICULAR; INTRALESIONAL; INTRAMUSCULAR; SOFT TISSUE at 13:45

## 2023-08-24 NOTE — PROGRESS NOTES
"Chief Complaint  Follow-up and Pain of the Right Knee and Follow-up and Pain of the Left Knee    Subjective    History of Present Illness      Chelsea Villalta is a 72 y.o. female who presents to [facility] for Patient returns today for bilateral knee pain.  Her pain is located over the medial aspect of the joint.  The pain has been progressive in nature and remains intermittent .  Her pain is worsened by driving, rising after sitting. There has been improvement in the past with injections.       Objective   Vital Signs:   Temp 97.8 øF (36.6 øC)   Ht 157.5 cm (62\")   Wt 83 kg (183 lb)   BMI 33.47 kg/mý     Physical Exam  72 y.o. female is awake, alert, oriented, in no acute distress and well developed, well nourished.  Ortho Exam   BILATERAL knee  Bilateral knee (valgus). Positive grind test. Pain and tenderness is present over the lateral aspect of the knee. Patient has some tenderness and irritability of the common peroneal nerve. Lateral joint line is exquisitely painful and tender. Patella is tending to track a little bit laterally. There is thickening of the synovial membrane. Range of motion in flexion is 0-110 degrees. Dorsalis pedis and posterior tibial artery pulses are palpable. Common peroneal nerve function is well preserved. Gait is cautious and antalgic. The patient has valgus orientation of the knee with a higher degree of external rotation than the contralateral side.There is fullness and tenderness in the Popliteal fossa. Getting out of a seated position is painful for the patient.       Result Review :                 Large Joint Arthrocentesis: R knee  Date/Time: 8/24/2023 1:45 PM  Consent given by: patient  Site marked: site marked  Timeout: Immediately prior to procedure a time out was called to verify the correct patient, procedure, equipment, support staff and site/side marked as required   Supporting Documentation  Indications: pain   Procedure " Details  Location: knee - R knee  Preparation: Patient was prepped and draped in the usual sterile fashion  Needle size: 25 G  Approach: anteromedial  Medications administered: 2 mL lidocaine PF 2% 2 %; 160 mg methylPREDNISolone acetate 80 MG/ML  Patient tolerance: patient tolerated the procedure well with no immediate complications      Large Joint Arthrocentesis: L knee  Date/Time: 8/24/2023 1:45 PM  Consent given by: patient  Site marked: site marked  Timeout: Immediately prior to procedure a time out was called to verify the correct patient, procedure, equipment, support staff and site/side marked as required   Supporting Documentation  Indications: pain   Procedure Details  Location: knee - L knee  Preparation: Patient was prepped and draped in the usual sterile fashion  Needle size: 25 G  Approach: anteromedial  Medications administered: 2 mL lidocaine PF 2% 2 %; 160 mg methylPREDNISolone acetate 80 MG/ML  Patient tolerance: patient tolerated the procedure well with no immediate complications             Assessment   Assessment and Plan    Problem List Items Addressed This Visit          Musculoskeletal and Injuries    Primary osteoarthritis of right knee    Relevant Orders    Large Joint Arthrocentesis: R knee    Primary osteoarthritis of left knee - Primary    Relevant Orders    Large Joint Arthrocentesis: L knee       Follow Up   Injected patient's bilateral knee joint(s)with Depo-Medrol from an anteromedial approach   Compression/brace   Rest, ice, compression, and elevation (RICE) therapy  OTC Alternate Ibuprofen and Tylenol as needed  Follow up in 3 month(s)  Patient was given instructions and counseling regarding her condition or for health maintenance advice. Please see specific information pulled into the AVS if appropriate.     Alirio Chapa MD   Date of Encounter: 8/24/2023   Electronically signed by Alirio Chapa MD, 08/24/23, 1:45 PM EDT.     EMR Dragon/Transcription disclaimer:  Much of this  encounter note is an electronic transcription/translation of spoken language to printed text. The electronic translation of spoken language may permit erroneous, or at times, nonsensical words or phrases to be inadvertently transcribed; Although I have reviewed the note for such errors, some may still exist.

## 2023-08-25 RX ORDER — LIDOCAINE HYDROCHLORIDE 20 MG/ML
2 INJECTION, SOLUTION EPIDURAL; INFILTRATION; INTRACAUDAL; PERINEURAL
Status: COMPLETED | OUTPATIENT
Start: 2023-08-24 | End: 2023-08-24

## 2023-08-25 RX ORDER — METHYLPREDNISOLONE ACETATE 80 MG/ML
160 INJECTION, SUSPENSION INTRA-ARTICULAR; INTRALESIONAL; INTRAMUSCULAR; SOFT TISSUE
Status: COMPLETED | OUTPATIENT
Start: 2023-08-24 | End: 2023-08-24

## 2023-12-07 ENCOUNTER — CLINICAL SUPPORT (OUTPATIENT)
Dept: ORTHOPEDIC SURGERY | Facility: CLINIC | Age: 73
End: 2023-12-07
Payer: MEDICARE

## 2023-12-07 VITALS — WEIGHT: 183 LBS | TEMPERATURE: 98.6 F | BODY MASS INDEX: 33.68 KG/M2 | HEIGHT: 62 IN

## 2023-12-07 DIAGNOSIS — M17.12 PRIMARY OSTEOARTHRITIS OF LEFT KNEE: ICD-10-CM

## 2023-12-07 DIAGNOSIS — M17.11 PRIMARY OSTEOARTHRITIS OF RIGHT KNEE: Primary | ICD-10-CM

## 2023-12-07 PROCEDURE — 20610 DRAIN/INJ JOINT/BURSA W/O US: CPT | Performed by: ORTHOPAEDIC SURGERY

## 2023-12-07 RX ADMIN — METHYLPREDNISOLONE ACETATE 160 MG: 80 INJECTION, SUSPENSION INTRA-ARTICULAR; INTRALESIONAL; INTRAMUSCULAR; SOFT TISSUE at 13:22

## 2023-12-07 RX ADMIN — LIDOCAINE HYDROCHLORIDE 2 ML: 10 INJECTION, SOLUTION EPIDURAL; INFILTRATION; INTRACAUDAL; PERINEURAL at 13:22

## 2023-12-07 NOTE — PROGRESS NOTES
"Chief Complaint  Follow-up and Pain of the Left Knee and Follow-up and Pain of the Right Knee    Subjective    History of Present Illness      Chelsea Villalta is a 73 y.o. female who presents to [facility] for Patient returns today for bilateral knee pain.  Her pain is located over the medial aspect of the joint.  The pain has been progressive in nature and remains intermittent .  Her pain is worsened by rising after sitting. There has been improvement in the past with injections.       Objective   Vital Signs:   Temp 98.6 °F (37 °C)   Ht 157.5 cm (62\")   Wt 83 kg (183 lb)   BMI 33.47 kg/m²     Physical Exam  73 y.o. female is awake, alert, oriented, in no acute distress and well developed, well nourished.  Ortho Exam   BILATERAL knee  Bilateral knee (valgus). Positive grind test. Pain and tenderness is present over the lateral aspect of the knee. Patient has some tenderness and irritability of the common peroneal nerve. Lateral joint line is exquisitely painful and tender. Patella is tending to track a little bit laterally. There is thickening of the synovial membrane. Range of motion in flexion is 0-110 degrees. Dorsalis pedis and posterior tibial artery pulses are palpable. Common peroneal nerve function is well preserved. Gait is cautious and antalgic. The patient has valgus orientation of the knee with a higher degree of external rotation than the contralateral side.There is fullness and tenderness in the Popliteal fossa. Getting out of a seated position is painful for the patient.       Result Review :                 - Large Joint Arthrocentesis: bilateral knee on 12/7/2023 1:22 PM  Indications: pain  Details: 25 G needle, anteromedial approach  Medications (Right): 2 mL lidocaine PF 1% 1 %; 160 mg methylPREDNISolone acetate 80 MG/ML  Medications (Left): 160 mg methylPREDNISolone acetate 80 MG/ML; 2 mL lidocaine PF 1% 1 %  Outcome: tolerated well, no immediate " complications  Procedure, treatment alternatives, risks and benefits explained, specific risks discussed. Consent was given by the patient. Patient was prepped and draped in the usual sterile fashion.                Assessment   Assessment and Plan    Problem List Items Addressed This Visit          Musculoskeletal and Injuries    Primary osteoarthritis of right knee - Primary    Relevant Orders    - Large Joint Arthrocentesis: bilateral knee    Primary osteoarthritis of left knee    Relevant Orders    - Large Joint Arthrocentesis: bilateral knee       Follow Up   Injected patient's bilateral knee joint(s)with Depo-Medrol from an anteromedial approach   Compression/brace   Rest, ice, compression, and elevation (RICE) therapy  OTC Alternate Ibuprofen and Tylenol as needed  Follow up in 3 month(s)  Patient was given instructions and counseling regarding her condition or for health maintenance advice. Please see specific information pulled into the AVS if appropriate.     Alirio Chapa MD   Date of Encounter: 12/7/2023   Electronically signed by Alirio Chapa MD, 12/07/23, 1:22 PM EST.     EMR Dragon/Transcription disclaimer:  Much of this encounter note is an electronic transcription/translation of spoken language to printed text. The electronic translation of spoken language may permit erroneous, or at times, nonsensical words or phrases to be inadvertently transcribed; Although I have reviewed the note for such errors, some may still exist.

## 2023-12-11 RX ORDER — LIDOCAINE HYDROCHLORIDE 10 MG/ML
2 INJECTION, SOLUTION EPIDURAL; INFILTRATION; INTRACAUDAL; PERINEURAL
Status: COMPLETED | OUTPATIENT
Start: 2023-12-07 | End: 2023-12-07

## 2023-12-11 RX ORDER — METHYLPREDNISOLONE ACETATE 80 MG/ML
160 INJECTION, SUSPENSION INTRA-ARTICULAR; INTRALESIONAL; INTRAMUSCULAR; SOFT TISSUE
Status: COMPLETED | OUTPATIENT
Start: 2023-12-07 | End: 2023-12-07